# Patient Record
Sex: MALE | Race: OTHER
[De-identification: names, ages, dates, MRNs, and addresses within clinical notes are randomized per-mention and may not be internally consistent; named-entity substitution may affect disease eponyms.]

---

## 2021-09-26 ENCOUNTER — HOSPITAL ENCOUNTER (EMERGENCY)
Dept: HOSPITAL 7 - FB.ED | Age: 46
Discharge: HOME | End: 2021-09-26
Payer: COMMERCIAL

## 2021-09-26 DIAGNOSIS — F17.210: ICD-10-CM

## 2021-09-26 DIAGNOSIS — U07.1: Primary | ICD-10-CM

## 2021-09-26 PROCEDURE — U0003 INFECTIOUS AGENT DETECTION BY NUCLEIC ACID (DNA OR RNA); SEVERE ACUTE RESPIRATORY SYNDROME CORONAVIRUS 2 (SARS-COV-2) (CORONAVIRUS DISEASE [COVID-19]), AMPLIFIED PROBE TECHNIQUE, MAKING USE OF HIGH THROUGHPUT TECHNOLOGIES AS DESCRIBED BY CMS-2020-01-R: HCPCS

## 2021-09-26 NOTE — EDM.PDOC
ED HPI GENERAL MEDICAL PROBLEM





- General


Stated Complaint: COVID SYMPTOMS


Time Seen by Provider: 09/26/21 12:25


Source of Information: Reports: Patient


History Limitations: Reports: No Limitations





- History of Present Illness


INITIAL COMMENTS - FREE TEXT/NARRATIVE: 





c/o COVID exposure





lives alone, works for [employer] company, 2 of his co-workers tested positive for

COVID in past 24 hours





has loss of taste and loose stools, thinks he may have had a fever at home, no 

fever here altho took APAP 1.5h PTA





h/o asthma but rare cough, no wheeze





no other chronic health concerns, no Rx meds











- Related Data


                                    Allergies











Allergy/AdvReac Type Severity Reaction Status Date / Time


 


No Known Allergies Allergy   Verified 09/26/21 11:48











Home Meds: 


                                    Home Meds





NK [No Known Home Meds]  09/26/21 [History]











Past Medical History


Respiratory History: Reports: Asthma





Social & Family History





- Family History


Family Medical History: No Pertinent Family History





- Tobacco Use


Tobacco Use Status *Q: Current Every Day Tobacco User


Years of Tobacco use: 20


Packs/Tins Daily: 0.2





- Caffeine Use


Caffeine Use: Reports: Coffee





- Recreational Drug Use


Recreational Drug Use: No





ED ROS GENERAL





- Review of Systems


Review Of Systems: See Below


Constitutional: Reports: No Symptoms


HEENT: Reports: Other (loss of taste)


Respiratory: Reports: No Symptoms


Cardiovascular: Reports: No Symptoms


Endocrine: Reports: No Symptoms


GI/Abdominal: Reports: Diarrhea


: Reports: No Symptoms


Musculoskeletal: Reports: No Symptoms


Skin: Reports: No Symptoms


Neurological: Reports: No Symptoms


Psychiatric: Reports: No Symptoms


Hematologic/Lymphatic: Reports: No Symptoms


Immunologic: Reports: No Symptoms





ED EXAM, GENERAL





- Physical Exam


Exam: See Below


Exam Limited By: No Limitations


General Appearance: Alert, WD/WN, No Apparent Distress


Eye Exam: Bilateral Eye: Normal Inspection


Ears: Hearing Grossly Normal


Nose: Normal Inspection, Normal Mucosa, No Blood


Throat/Mouth: Normal Inspection, Normal Voice, No Airway Compromise


Head: Atraumatic, Normocephalic


Neck: Normal Inspection, Supple, Non-Tender, Full Range of Motion


Respiratory/Chest: No Respiratory Distress, Lungs Clear, Normal Breath Sounds, 

Chest Non-Tender.  No: Respiratory Distress, Decreased Breath Sounds, Crackles, 

Rales, Rhonchi, Wheezing, Stridor, Pleural Rub, Accessory Muscle Use, 

Retractions, Splinting, Prolonged Expiration


Cardiovascular: Regular Rate, Rhythm, No Edema, No Murmur


GI/Abdominal: Soft, Non-Tender, No Distention


Back Exam: Normal Inspection, Full Range of Motion.  No: CVA Tenderness (R), CVA

 Tenderness (L)


Extremities: Normal Inspection, Normal Range of Motion, Non-Tender, No Pedal 

Edema


Neurological: Alert, Oriented, CN II-XII Intact, Normal Cognition, No 

Motor/Sensory Deficits


Psychiatric: Normal Affect, Normal Mood


Skin Exam: Warm, Dry, Intact, Normal Color, No Rash


Lymphatic: No Adenopathy





Course





- Vital Signs


Last Recorded V/S: 


                                Last Vital Signs











Temp  36.6 C   09/26/21 11:38


 


Pulse  77   09/26/21 13:28


 


Resp  18   09/26/21 11:38


 


BP  154/91 H  09/26/21 13:28


 


Pulse Ox  99   09/26/21 11:38














- Orders/Labs/Meds


Orders: 


                               Active Orders 24 hr











 Category Date Time Status


 


 CORONAVIRUS (COVID19) [facility] Routine Lab  09/26/21 11:55 Received














- Re-Assessments/Exams


Free Text/Narrative Re-Assessment/Exam: 





09/26/21 16:01


repeat BP improved, pt to see PCP and recheck BP





Departure





- Departure


Time of Disposition: 13:17


Disposition: Home, Self-Care 01


Condition: Good


Clinical Impression: 


 Exposure to COVID-19 virus








- Discharge Information


*PRESCRIPTION DRUG MONITORING PROGRAM REVIEWED*: Not Applicable


*COPY OF PRESCRIPTION DRUG MONITORING REPORT IN PATIENT JANICE: Not Applicable


Instructions:  3 Key Steps to Take While Waiting for Your COVID-19 Test Result -

 [facility] (07/09/2021), 10 Things You Can Do to Manage Your COVID-19 Symptoms at Home

 - [facility] (07/16/2021), COVID-19: Quarantine vs. Isolation - [facility] (12/17/2020)


Referrals: 


PCP,None [Primary Care Provider] - 


Forms:  ED Department Discharge


Additional Instructions: 


Your COVID test should be back in 2 days.





Self-quarantine for 10 days even if it is negative.





If you test is positive, you may return to work once you have had no fever for 7

days.





Maintain 6 foot social distance. Use good handwashing.





For discomfort, as needed, take ibuprofen 200 mg 3 tabs and acetaminophen 325 mg

3 tabs 4 times a day.





See your doctor or return to Emergency Department if you are feeling worse.





Sepsis Event Note (ED)





- Evaluation


Sepsis Screening Result: No Definite Risk





- Focused Exam


Vital Signs: 


                                   Vital Signs











  Temp Pulse Resp BP Pulse Ox


 


 09/26/21 13:28   77   154/91 H 


 


 09/26/21 11:38  36.6 C  102 H  18  161/102 H  99














- My Orders


Last 24 Hours: 


My Active Orders





09/26/21 11:55


CORONAVIRUS (COVID19) [facility] Routine 














- Assessment/Plan


Last 24 Hours: 


My Active Orders





09/26/21 11:55


CORONAVIRUS (COVID19) [facility] Routine

## 2022-03-21 ENCOUNTER — HOSPITAL ENCOUNTER (EMERGENCY)
Dept: HOSPITAL 7 - FB.ED | Age: 47
Discharge: NURSING, REHAB OR LONG-TERM CARE | End: 2022-03-21
Payer: COMMERCIAL
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
## 2022-03-21 DIAGNOSIS — S62.634B: Primary | ICD-10-CM

## 2022-03-21 DIAGNOSIS — Z72.0: ICD-10-CM
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
## 2022-03-21 PROCEDURE — 96365 THER/PROPH/DIAG IV INF INIT: CPT

## 2022-03-21 PROCEDURE — 99284 EMERGENCY DEPT VISIT MOD MDM: CPT

## 2022-03-21 PROCEDURE — 64450 NJX AA&/STRD OTHER PN/BRANCH: CPT

## 2022-03-21 PROCEDURE — 73140 X-RAY EXAM OF FINGER(S): CPT
